# Patient Record
Sex: MALE | Race: ASIAN | NOT HISPANIC OR LATINO | URBAN - METROPOLITAN AREA
[De-identification: names, ages, dates, MRNs, and addresses within clinical notes are randomized per-mention and may not be internally consistent; named-entity substitution may affect disease eponyms.]

---

## 2023-03-10 ENCOUNTER — OUTPATIENT (OUTPATIENT)
Dept: OUTPATIENT SERVICES | Facility: HOSPITAL | Age: 33
LOS: 1 days | End: 2023-03-10

## 2023-03-10 ENCOUNTER — APPOINTMENT (OUTPATIENT)
Dept: ULTRASOUND IMAGING | Facility: CLINIC | Age: 33
End: 2023-03-10
Payer: COMMERCIAL

## 2023-03-10 ENCOUNTER — TRANSCRIPTION ENCOUNTER (OUTPATIENT)
Age: 33
End: 2023-03-10

## 2023-03-10 ENCOUNTER — EMERGENCY (EMERGENCY)
Facility: HOSPITAL | Age: 33
LOS: 1 days | Discharge: HOME | End: 2023-03-10
Attending: EMERGENCY MEDICINE | Admitting: EMERGENCY MEDICINE
Payer: COMMERCIAL

## 2023-03-10 VITALS
TEMPERATURE: 98 F | SYSTOLIC BLOOD PRESSURE: 124 MMHG | HEIGHT: 69.29 IN | HEART RATE: 71 BPM | WEIGHT: 145.06 LBS | RESPIRATION RATE: 18 BRPM | DIASTOLIC BLOOD PRESSURE: 82 MMHG | OXYGEN SATURATION: 98 %

## 2023-03-10 VITALS
DIASTOLIC BLOOD PRESSURE: 85 MMHG | TEMPERATURE: 98 F | OXYGEN SATURATION: 98 % | HEART RATE: 75 BPM | SYSTOLIC BLOOD PRESSURE: 130 MMHG | RESPIRATION RATE: 18 BRPM

## 2023-03-10 LAB
ALBUMIN SERPL ELPH-MCNC: 4.9 G/DL — SIGNIFICANT CHANGE UP (ref 3.4–5)
ALP SERPL-CCNC: 62 U/L — SIGNIFICANT CHANGE UP (ref 40–120)
ALT FLD-CCNC: 34 U/L — SIGNIFICANT CHANGE UP (ref 12–42)
ANION GAP SERPL CALC-SCNC: 3 MMOL/L — LOW (ref 9–16)
AST SERPL-CCNC: 24 U/L — SIGNIFICANT CHANGE UP (ref 15–37)
BASOPHILS # BLD AUTO: 0.05 K/UL — SIGNIFICANT CHANGE UP (ref 0–0.2)
BASOPHILS NFR BLD AUTO: 0.8 % — SIGNIFICANT CHANGE UP (ref 0–2)
BILIRUB SERPL-MCNC: 1.2 MG/DL — SIGNIFICANT CHANGE UP (ref 0.2–1.2)
BUN SERPL-MCNC: 10 MG/DL — SIGNIFICANT CHANGE UP (ref 7–23)
CALCIUM SERPL-MCNC: 9.6 MG/DL — SIGNIFICANT CHANGE UP (ref 8.5–10.5)
CHLORIDE SERPL-SCNC: 104 MMOL/L — SIGNIFICANT CHANGE UP (ref 96–108)
CO2 SERPL-SCNC: 34 MMOL/L — HIGH (ref 22–31)
CREAT SERPL-MCNC: 1 MG/DL — SIGNIFICANT CHANGE UP (ref 0.5–1.3)
EGFR: 103 ML/MIN/1.73M2 — SIGNIFICANT CHANGE UP
EOSINOPHIL # BLD AUTO: 0.04 K/UL — SIGNIFICANT CHANGE UP (ref 0–0.5)
EOSINOPHIL NFR BLD AUTO: 0.7 % — SIGNIFICANT CHANGE UP (ref 0–6)
GLUCOSE SERPL-MCNC: 107 MG/DL — HIGH (ref 70–99)
HCT VFR BLD CALC: 48.6 % — SIGNIFICANT CHANGE UP (ref 39–50)
HGB BLD-MCNC: 15.7 G/DL — SIGNIFICANT CHANGE UP (ref 13–17)
IMM GRANULOCYTES NFR BLD AUTO: 0.2 % — SIGNIFICANT CHANGE UP (ref 0–0.9)
INR BLD: 1.01 — SIGNIFICANT CHANGE UP (ref 0.88–1.16)
LYMPHOCYTES # BLD AUTO: 1.68 K/UL — SIGNIFICANT CHANGE UP (ref 1–3.3)
LYMPHOCYTES # BLD AUTO: 27.5 % — SIGNIFICANT CHANGE UP (ref 13–44)
MCHC RBC-ENTMCNC: 28.2 PG — SIGNIFICANT CHANGE UP (ref 27–34)
MCHC RBC-ENTMCNC: 32.3 GM/DL — SIGNIFICANT CHANGE UP (ref 32–36)
MCV RBC AUTO: 87.4 FL — SIGNIFICANT CHANGE UP (ref 80–100)
MONOCYTES # BLD AUTO: 0.45 K/UL — SIGNIFICANT CHANGE UP (ref 0–0.9)
MONOCYTES NFR BLD AUTO: 7.4 % — SIGNIFICANT CHANGE UP (ref 2–14)
NEUTROPHILS # BLD AUTO: 3.87 K/UL — SIGNIFICANT CHANGE UP (ref 1.8–7.4)
NEUTROPHILS NFR BLD AUTO: 63.4 % — SIGNIFICANT CHANGE UP (ref 43–77)
NRBC # BLD: 0 /100 WBCS — SIGNIFICANT CHANGE UP (ref 0–0)
PLATELET # BLD AUTO: 247 K/UL — SIGNIFICANT CHANGE UP (ref 150–400)
POTASSIUM SERPL-MCNC: 3.7 MMOL/L — SIGNIFICANT CHANGE UP (ref 3.5–5.3)
POTASSIUM SERPL-SCNC: 3.7 MMOL/L — SIGNIFICANT CHANGE UP (ref 3.5–5.3)
PROT SERPL-MCNC: 8.6 G/DL — HIGH (ref 6.4–8.2)
PROTHROM AB SERPL-ACNC: 11.8 SEC — SIGNIFICANT CHANGE UP (ref 10.5–13.4)
RBC # BLD: 5.56 M/UL — SIGNIFICANT CHANGE UP (ref 4.2–5.8)
RBC # FLD: 12.5 % — SIGNIFICANT CHANGE UP (ref 10.3–14.5)
SODIUM SERPL-SCNC: 141 MMOL/L — SIGNIFICANT CHANGE UP (ref 132–145)
WBC # BLD: 6.1 K/UL — SIGNIFICANT CHANGE UP (ref 3.8–10.5)
WBC # FLD AUTO: 6.1 K/UL — SIGNIFICANT CHANGE UP (ref 3.8–10.5)

## 2023-03-10 PROCEDURE — 76882 US LMTD JT/FCL EVL NVASC XTR: CPT | Mod: 26,RT

## 2023-03-10 PROCEDURE — 76700 US EXAM ABDOM COMPLETE: CPT | Mod: 26

## 2023-03-10 PROCEDURE — 76881 US COMPL JOINT R-T W/IMG: CPT | Mod: 26,RT

## 2023-03-10 PROCEDURE — 99285 EMERGENCY DEPT VISIT HI MDM: CPT

## 2023-03-10 PROCEDURE — 76881 US COMPL JOINT R-T W/IMG: CPT | Mod: 26,LT

## 2023-03-10 PROCEDURE — 73706 CT ANGIO LWR EXTR W/O&W/DYE: CPT | Mod: 26,RT

## 2023-03-10 RX ORDER — SODIUM CHLORIDE 9 MG/ML
1000 INJECTION INTRAMUSCULAR; INTRAVENOUS; SUBCUTANEOUS ONCE
Refills: 0 | Status: COMPLETED | OUTPATIENT
Start: 2023-03-10 | End: 2023-03-10

## 2023-03-10 RX ADMIN — SODIUM CHLORIDE 1000 MILLILITER(S): 9 INJECTION INTRAMUSCULAR; INTRAVENOUS; SUBCUTANEOUS at 18:36

## 2023-03-10 NOTE — ED PROVIDER NOTE - NSFOLLOWUPINSTRUCTIONS_ED_ALL_ED_FT
Follow up with Dr. Johnathan Sharp vascular  surgeon (blood vessel doctor)  this Monday 3/12 at 830am without fail  Office is located at [address] [place] [address] 13 floor     avoid exertion or heavy lifting until evaluation     return to ER for any concern Follow up with Dr. Johnathan Sharp vascular  surgeon (blood vessel doctor)  this Monday 3/13 at 830am without fail  Office is located at [address] [place] [address] 13 floor     avoid exertion or heavy lifting until evaluation     return to ER for any concern Follow up with Dr. Johnathan Lambert vascular  surgeon (blood vessel doctor)  this Monday 3/13 at 830am without fail  Office is located at [address] [place] [address] 13 floor     avoid exertion or heavy lifting until evaluation     return to ER for any concern

## 2023-03-10 NOTE — ED PROVIDER NOTE - CARE PROVIDER_API CALL
Johnathan Lambert)  Vascular Surgery  [address], 13th [place] [address]  Phone: [phone]  Fax: [phone]  Follow Up Time: 1-3 Days

## 2023-03-10 NOTE — ED PROVIDER NOTE - PATIENT PORTAL LINK FT
You can access the FollowMyHealth Patient Portal offered by [facility] by registering at the following website: [email or web address]. By joining [facility]’s FollowMyHealth portal, you will also be able to view your health information using other applications (apps) compatible with our system.

## 2023-03-10 NOTE — ED PROVIDER NOTE - CLINICAL SUMMARY MEDICAL DECISION MAKING FREE TEXT BOX
labs and ct angio right LE     pt informed of right popliteal artery aneurysm and need to follow up with vascular surgery   I spoke with dr. kumar - who advised follow up in office on Monday (in 3 days)     all of patient's questions answered - he verbally expresses understanding of need to f/u on Monday labs and ct angio right LE     pt informed of right popliteal artery aneurysm and need to follow up with vascular surgery   I spoke with dr. Lambert  - who advised follow up in office on Monday (in 3 days)     all of patient's questions answered - he verbally expresses understanding of need to f/u on Monday

## 2023-03-10 NOTE — ED PROVIDER NOTE - OBJECTIVE STATEMENT
brought by [facility] radiology RN for a positive US read for a R popliteal arterial aneurysm.  as per pt, was at his PMD annual exam when the doctor sent him for an US. denies pain. US read by Dr. White ([phone])  - brought by [facility] radiology RN for a positive US read for a R popliteal arterial aneurysm.  as per pt, was at his PMD annual exam when the doctor sent him for an US. denies pain. US read by Dr. White ([phone])  -  33 yo male yossi evanst with swelling to right popliteal area since 2017. swelling has become more pronounced / uncomfortable over last several months.  no complaints of pain swelling numbness or change of color to right leg or foot.

## 2023-03-10 NOTE — ED PROVIDER NOTE - HEME LYMPH, MLM
No adenopathy or splenomegaly. No cervical or inguinal lymphadenopathy. + firm swelling inferior medial popliteal fossa non pulsatile non tender no erythema or increased warmth. 2+ pop dp and pt pulses bilaterally right leg compartments soft hair present on leg skin is normal temperature

## 2023-03-10 NOTE — ED ADULT NURSE NOTE - CHIEF COMPLAINT QUOTE
brought by [facility] radiology RN for a positive US read for a R popliteal arterial aneurysm.  as per pt, was at his PMD annual exam when the doctor sent him for an US. denies pain. US read by Dr. White ([phone])

## 2023-03-10 NOTE — ED ADULT NURSE NOTE - OBJECTIVE STATEMENT
patient here from 5th floor ultrasound; found to have possible right popliteal abscess; patient denies any pain to area at this time; ambulatory with steady gait

## 2023-03-10 NOTE — ED ADULT TRIAGE NOTE - CHIEF COMPLAINT QUOTE
brought by [facility] radiology RN for a positive US read for a R popliteal arterial aneurysm.  as per pt, was at his PMD annual exam when the doctor sent him for an US. denies pain. US read by Dr. White ([phone])

## 2023-03-11 PROBLEM — Z00.00 ENCOUNTER FOR PREVENTIVE HEALTH EXAMINATION: Status: ACTIVE | Noted: 2023-03-11

## 2023-03-13 ENCOUNTER — NON-APPOINTMENT (OUTPATIENT)
Age: 33
End: 2023-03-13

## 2023-03-13 ENCOUNTER — APPOINTMENT (OUTPATIENT)
Dept: VASCULAR SURGERY | Facility: CLINIC | Age: 33
End: 2023-03-13
Payer: COMMERCIAL

## 2023-03-13 VITALS
DIASTOLIC BLOOD PRESSURE: 84 MMHG | SYSTOLIC BLOOD PRESSURE: 120 MMHG | HEIGHT: 69.29 IN | HEART RATE: 71 BPM | BODY MASS INDEX: 21.23 KG/M2 | WEIGHT: 145 LBS

## 2023-03-13 DIAGNOSIS — I72.9 ANEURYSM OF UNSPECIFIED SITE: ICD-10-CM

## 2023-03-13 DIAGNOSIS — I72.4 ANEURYSM OF ARTERY OF LOWER EXTREMITY: ICD-10-CM

## 2023-03-13 DIAGNOSIS — Z78.9 OTHER SPECIFIED HEALTH STATUS: ICD-10-CM

## 2023-03-13 PROCEDURE — 36002 PSEUDOANEURYSM INJECTION TRT: CPT

## 2023-03-13 PROCEDURE — 99204 OFFICE O/P NEW MOD 45 MIN: CPT | Mod: 25

## 2023-03-14 NOTE — REVIEW OF SYSTEMS
[Negative] : Heme/Lymph [Lower Ext Edema] : no extremity edema [Leg Claudication] : no intermittent leg claudication [Limb Pain] : no limb pain

## 2023-03-14 NOTE — PROCEDURE
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 Ultrasound interrogation of R calf intramuscular pseudoaneurysm, branch of the R popliteal artery\par 2. Injection of pseudoaneurysm with 500 units of thrombin\par 3. Completion ultrasound\par  \par INTRAOPERATIVE FINDINGS:\par Approximately 3.5 cm pseudoaneurysm of with color flow noted on Doppler examination.  Following injection there was no color flow within the pseudoaneurysm sac.  There was color flow with a normal spectral waveform in the popliteal artery distal to the area of injection.\par  \par ESTIMATED BLOOD LOSS: Minimal (<20 mL)\par  \par COMPLICATIONS: None\par  \par CONDITION AT COMPLETION: Stable\par  \par DETAILS OF PROCEDURE:\par The patient was brought to the procedure room, appropriately identified, and laid prone on the procedure table. The R calf was interrogated under ultrasound and the intramuscular pseudoaneurysm was noted to be patent, compressible, and with some evidence of intraluminal thrombus. A to-and-fro flow pattern on spectral waveform was noted within the pseudoaneurysm sac and the neck, confirming that we had indeed visualized the sac.  Under ultrasound guidance a micropuncture needle was introduced into the sac and visualized entering the sac.  On color flow Doppler we slowly injected thrombin at 100 unit aliquots until the color flow was eliminated.\par  \par Following this we re-examined the popliteal artery distal to this and found it to be patent by identifying a normal spectral waveform on Doppler examination.\par  \par The procedure was thus concluded.\par

## 2023-03-14 NOTE — PHYSICAL EXAM
[Respiratory Effort] : normal respiratory effort [Normal Heart Sounds] : normal heart sounds [2+] : left 2+ [Alert] : alert [Calm] : calm [Ankle Swelling (On Exam)] : not present [Abdomen Tenderness] : ~T ~M No abdominal tenderness [de-identified] : WN/WD, NAD [de-identified] : NC/AT [de-identified] : supple [de-identified] : +FROM 5/5x4 [de-identified] : RLE: + firm mass over  inferior medial popliteal fossa, non pulsatile, non\par tender

## 2023-03-14 NOTE — ASSESSMENT
[Arterial/Venous Disease] : arterial/venous disease [FreeTextEntry1] : 33 yo M with no significant PMHx who was referred by ED due to a finding on US and CT of "right popliteal artery aneurysm". Patient states that in 2017 while in [place] he was told that he has an arterial aneurysm in his right LE that he most likely developed after a trauma, however at that time he was told no intervention was required and he was told to watch for growth. He states that he noticed it became larger and he went to ED for an evaluation. He denies pain, claudication, edema, but noticed that "mass behind his knee" became larger.\par Bilateral palpable peripheral pulses, RLE with firm mass over  inferior medial popliteal fossa, non pulsatile, non\par tender.\par We reviewed the CT and explained that his aneurysm is noted not in popliteal artery but in a small branch of pop. artery therefore he would benefit from thrombin injection of it. RABs were explained, patient agreed and we proceed with embolization of a branch aneurysm.\par Patient tolerated the procedure well.\par He will f/u in 1 week.

## 2023-03-14 NOTE — HISTORY OF PRESENT ILLNESS
[FreeTextEntry1] : 31 yo M with no significant PMHx who was referred by ED due to a finding on US and CT of "right popliteal artery aneurysm". Patient states that in 2017 while in [place] he was told that he has an arterial aneurysm in his right LE that he most likely developed after a trauma, however at that time he was told no intervention was required and he was told to watch for growth. He states that he noticed it became larger and he went to ED for an evaluation. He denies pain, claudication, edema, but noticed that "mass behind his knee" became larger.\par \par \par CTA RLE 3/10/2023: Saccular aneurysm measuring 3.3 x 2.6 x 4.0 cm in right upper calf muscle  with direct arterial feeders from popliteal artery, drainage via serpiginous tributaries superiorly into popliteal vein with early venous enhancement. No thrombosis. Couple adjacent coarse calcification superiorly. Patent triple vessel runoff.

## 2023-03-20 ENCOUNTER — APPOINTMENT (OUTPATIENT)
Dept: VASCULAR SURGERY | Facility: CLINIC | Age: 33
End: 2023-03-20
Payer: COMMERCIAL

## 2023-03-20 PROCEDURE — 93926 LOWER EXTREMITY STUDY: CPT

## 2023-03-20 PROCEDURE — 99214 OFFICE O/P EST MOD 30 MIN: CPT

## 2023-03-20 NOTE — HISTORY OF PRESENT ILLNESS
[FreeTextEntry1] : 33 yo M with no significant PMHx who was noted to have pseudoaneurysm of a branch artery of right upper calf muscle, who was seen last week, s/p thrombin injection in the office returns for a follow up. He states that his leg feels much better since the procedure. Patient is feeling good, denies pain, leg edema, no fever, chills, skin breakdown.

## 2023-03-20 NOTE — PHYSICAL EXAM
[Respiratory Effort] : normal respiratory effort [Normal Heart Sounds] : normal heart sounds [2+] : left 2+ [Alert] : alert [Calm] : calm [Ankle Swelling (On Exam)] : not present [Abdomen Tenderness] : ~T ~M No abdominal tenderness [de-identified] : WN/WD, NAD [de-identified] : NC/AT [de-identified] : supple [de-identified] : +FROM 5/5x4 [de-identified] : RLE: +soft  mass over  inferior medial popliteal fossa, smaller, non\par tender

## 2023-03-20 NOTE — ASSESSMENT
[Arterial/Venous Disease] : arterial/venous disease [FreeTextEntry1] : 33 yo M with no significant PMHx who was noted to have pseudoaneurysm of a branch artery of right upper calf muscle, who was seen last week, s/p thrombin injection in the office returns for a follow up.\par Patient states that his right leg feels better, denies pain, fever, chills, edema.\par Right popliteal fossa with soft  mass over inferior  popliteal fossa, that is much smaller, non tender\par RLE arterial duplex was done in the office today demonstrating patent arterial system, thrombosed pseudoaneurysm, with no active flow.\par We discussed the findings and explained that thrombosed pseudoaneurysm will slowly diminish, but it won't cause any issues in the future, there is no more risk for its rupture.\par No restrictions in physical activities.\par He may f/u as needed.

## 2023-03-20 NOTE — PROCEDURE
[FreeTextEntry1] : RLE arterial duplex was done in the office today demonstrating patent arterial system, thrombosed pseudoaneurysm, with no active flow.

## 2023-03-20 NOTE — ADDENDUM
[FreeTextEntry1] : I, Dr. Johnathan Lambert, personally performed the evaluation and management (E/M) services for this established patient who presents today with (an) existing condition(s).  That E/M includes conducting the examination, assessing all conditions, and (re)establishing/reinforcing a plan of care.  Today, my ACP, Cassie CRAWFORD, was here to observe my evaluation and management services for this condition to be followed going forward.\par \par \par
